# Patient Record
Sex: MALE | Race: WHITE | NOT HISPANIC OR LATINO | URBAN - METROPOLITAN AREA
[De-identification: names, ages, dates, MRNs, and addresses within clinical notes are randomized per-mention and may not be internally consistent; named-entity substitution may affect disease eponyms.]

---

## 2019-08-21 ENCOUNTER — EMERGENCY (EMERGENCY)
Facility: HOSPITAL | Age: 64
LOS: 0 days | Discharge: HOME | End: 2019-08-21
Attending: EMERGENCY MEDICINE
Payer: COMMERCIAL

## 2019-08-21 VITALS
SYSTOLIC BLOOD PRESSURE: 155 MMHG | WEIGHT: 179.9 LBS | HEIGHT: 71 IN | OXYGEN SATURATION: 97 % | HEART RATE: 57 BPM | DIASTOLIC BLOOD PRESSURE: 92 MMHG | RESPIRATION RATE: 16 BRPM | TEMPERATURE: 98 F

## 2019-08-21 VITALS
TEMPERATURE: 98 F | HEART RATE: 56 BPM | OXYGEN SATURATION: 97 % | SYSTOLIC BLOOD PRESSURE: 148 MMHG | RESPIRATION RATE: 17 BRPM | DIASTOLIC BLOOD PRESSURE: 82 MMHG

## 2019-08-21 DIAGNOSIS — M25.512 PAIN IN LEFT SHOULDER: ICD-10-CM
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
## 2019-08-21 DIAGNOSIS — S13.4XXA SPRAIN OF LIGAMENTS OF CERVICAL SPINE, INITIAL ENCOUNTER: ICD-10-CM
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
## 2019-08-21 DIAGNOSIS — S40.012A CONTUSION OF LEFT SHOULDER, INITIAL ENCOUNTER: ICD-10-CM

## 2019-08-21 PROCEDURE — 72125 CT NECK SPINE W/O DYE: CPT | Mod: 26

## 2019-08-21 PROCEDURE — 72125 CT NECK SPINE W/O DYE: CPT

## 2019-08-21 PROCEDURE — 70450 CT HEAD/BRAIN W/O DYE: CPT

## 2019-08-21 PROCEDURE — 73030 X-RAY EXAM OF SHOULDER: CPT | Mod: 26,LT

## 2019-08-21 PROCEDURE — 99284 EMERGENCY DEPT VISIT MOD MDM: CPT | Mod: 25

## 2019-08-21 PROCEDURE — 99284 EMERGENCY DEPT VISIT MOD MDM: CPT

## 2019-08-21 PROCEDURE — 70450 CT HEAD/BRAIN W/O DYE: CPT | Mod: 26

## 2019-08-21 PROCEDURE — 73030 X-RAY EXAM OF SHOULDER: CPT | Mod: LT

## 2019-08-21 RX ORDER — IBUPROFEN 200 MG
600 TABLET ORAL ONCE
Refills: 0 | Status: COMPLETED | OUTPATIENT
Start: 2019-08-21 | End: 2019-08-21

## 2019-08-21 RX ADMIN — Medication 600 MILLIGRAM(S): at 14:10

## 2019-08-21 RX ADMIN — Medication 600 MILLIGRAM(S): at 13:20

## 2019-08-21 NOTE — ED PROVIDER NOTE - CHPI ED SYMPTOMS NEG
no visual changes, no speech changes, no chest pain, no SOB, no nausea, no vomiting, no abd pain, no weakness, no numbness/tingling/no loss of consciousness
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
## 2019-08-21 NOTE — ED PROVIDER NOTE - PROGRESS NOTE DETAILS
Dr. Reyez:  Reevaluated patient at bedside.  Patient feeling much improved, normal gait upon testing.  Discussed the results of all diagnostic testing in ED and copies of all reports given.   An opportunity to ask questions was given.  Discussed the importance of prompt, close medical follow-up.  Patient will return with any changes, concerns or persistent / worsening symptoms.  Understanding of all instructions verbalized.

## 2019-08-21 NOTE — ED PROVIDER NOTE - ENMT, MLM
Oral pharynx clear, mucous membrane slightly dry. no clinical evidence of facial injury. Negative solares's sign.

## 2019-08-21 NOTE — ED PROVIDER NOTE - MUSCULOSKELETAL, MLM
Spine appears normal, Neck: +C-collar, no midline tenderness, no step off or deformities. Back: non tender, stable. Pelvis: non tender, stable. Left shoulder: no gross deformities, decrease ROM due to pain, no focal tenderness. LUE: non tender, no swelling, left elbow; non tender, no effusion, ROM without pain, left radial pulse intact, normal grasp, normal sensory Spine appears normal, Neck: +C-collar, no midline tenderness, no step off or deformities. Back: non tender, stable. Pelvis: non tender, stable. Left shoulder: no gross deformities, landmarks + normal, mildly decreased ROM due to pain w/o loss of smoothness nor palpable clicking noted, no focal tenderness. LUE: non tender, no swelling, left elbow; non tender, no effusion, ROM without pain, left radial pulse intact, normal grasp, normal sensory
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
## 2019-08-21 NOTE — ED PROVIDER NOTE - CARE PROVIDER_API CALL
Neptali Ghosh)  Orthopaedic Surgery; Surgery of the Hand  [address]  [place] [address]  Phone: [phone]  Fax: [phone]  Follow Up Time:
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
## 2019-08-21 NOTE — ED PROVIDER NOTE - NEUROLOGICAL, MLM
Alert and oriented, no focal deficits, no motor or sensory deficits. Alert and oriented, CNs II - XII intact, normal speech, no focal deficits, no motor or sensory deficits, including LUE M/R/U testing.
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
## 2020-06-30 NOTE — ED ADULT NURSE NOTE - CHIEF COMPLAINT
REVIEW OF SYSTEMS:  Constitutional: Negative  Integumentary problem(s):negative  HEENT Problem(s): Negative  Cardiovascular problem(s): Negative  Respiratory problem(s): none  Gastro-intestinal problem(s): none  Genito-urinary problem(s): Negative  Musculoskeletal problem(s): Negative  Neurological problem(s): Negative  Psychiatric problem(s): Negative  Endocrine problem(s): Negative  Hematologic and/or Lymphatic problem(s): Negative    Patient does not smoke.    Patient does not take aspirin.  Reason patient does not take aspirin: Not prescribed       The patient is a 64y Male complaining of